# Patient Record
Sex: MALE | Race: WHITE | NOT HISPANIC OR LATINO
[De-identification: names, ages, dates, MRNs, and addresses within clinical notes are randomized per-mention and may not be internally consistent; named-entity substitution may affect disease eponyms.]

---

## 2019-05-28 PROBLEM — Z00.00 ENCOUNTER FOR PREVENTIVE HEALTH EXAMINATION: Status: ACTIVE | Noted: 2019-05-28

## 2019-06-18 ENCOUNTER — NON-APPOINTMENT (OUTPATIENT)
Age: 74
End: 2019-06-18

## 2019-06-18 ENCOUNTER — APPOINTMENT (OUTPATIENT)
Dept: CARDIOLOGY | Facility: CLINIC | Age: 74
End: 2019-06-18
Payer: MEDICARE

## 2019-06-18 VITALS — DIASTOLIC BLOOD PRESSURE: 72 MMHG | SYSTOLIC BLOOD PRESSURE: 136 MMHG

## 2019-06-18 VITALS
HEIGHT: 68 IN | SYSTOLIC BLOOD PRESSURE: 142 MMHG | BODY MASS INDEX: 24.86 KG/M2 | WEIGHT: 164 LBS | OXYGEN SATURATION: 97 % | DIASTOLIC BLOOD PRESSURE: 80 MMHG | HEART RATE: 47 BPM

## 2019-06-18 DIAGNOSIS — Z87.19 PERSONAL HISTORY OF OTHER DISEASES OF THE DIGESTIVE SYSTEM: ICD-10-CM

## 2019-06-18 DIAGNOSIS — Z87.891 PERSONAL HISTORY OF NICOTINE DEPENDENCE: ICD-10-CM

## 2019-06-18 PROCEDURE — 99214 OFFICE O/P EST MOD 30 MIN: CPT

## 2019-06-18 PROCEDURE — 93000 ELECTROCARDIOGRAM COMPLETE: CPT

## 2019-06-18 RX ORDER — HYDROCHLOROTHIAZIDE 12.5 MG/1
12.5 TABLET ORAL
Qty: 90 | Refills: 0 | Status: ACTIVE | COMMUNITY
Start: 2019-03-11

## 2019-06-18 RX ORDER — AMLODIPINE BESYLATE 5 MG/1
5 TABLET ORAL
Qty: 90 | Refills: 0 | Status: ACTIVE | COMMUNITY
Start: 2019-03-20

## 2019-06-18 NOTE — DISCUSSION/SUMMARY
[FreeTextEntry1] : Yosi is a 73 year-old male with medical history detailed above and active medical issues including:\par \par - Dyspnea moderate exertion, fatigue, multiple CAD risk factors. Patient will have noninvasive testing with a exercise Myoview stress test to assess for obstructive CAD, exercise-induced arrhythmia,  blood pressure response, 2DEcho for LVEF, wall motion, structural heart disease,  carotid and abdominal ultrasound to assess for obstructive PAD. \par \par - Hypertension at BP goal <130/80 on amlodipine, losartan HCTZ, carvedilol\par \par - Bradycardia on carvedilol. Holter monitor for evaluation of bradycardia, pauses.\par \par - Remote stroke history 1998, nonobstructive catheterization at that time.  Currently on Plavix. \par \par - Remote history tobacco quit 1982\par \par \par Patient will be seen in cardiology follow-up after noninvasive testing.\par \par \par Advised patient to follow active lifestyle with regular cardiovascular exercise. Patient educated on lifestyle and diet modification with low sodium low fat diet and avoidance of excessive alcohol. Patient is aware to call with any symptoms or concerns. \par \par Yosi will followup with Dr Michael Calderon in [place] for primary care.\par \par

## 2019-06-18 NOTE — REASON FOR VISIT
[Consultation] : a consultation regarding [FreeTextEntry2] : PATEL, HTN, CVA, murmur, fatigue, bradycardia on coreg [FreeTextEntry1] : Yosi is a 73-year-old male with history of hypertension, remote tobacco, syncope, stroke 1998, nonobstructive catheterization 1998. \par \par Patient is currently living locally wishes to establish cardiologist, sent for cardiology evaluation by Dr Michael Canchola in [facility], [phone]\par \par Patient has dyspnea with moderate exertion.  Patient has increasing fatigue over the past several months.  Cardiovascular review of symptoms is negative for exertional chest pain, palpitations, dizziness or syncope.  No PND or orthopnea leg edema.  No bleeding or black stool.\par \par Patient is walking 10 minutes without exertional chest pain.  No exercise routine.  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 \par \par EKG 6/18/19 sinus bradycardia 47 bpm, nondiagnostic RSR V1\par

## 2019-06-18 NOTE — PHYSICAL EXAM
[General Appearance - Well Developed] : well developed [Well Groomed] : well groomed [Normal Appearance] : normal appearance [General Appearance - Well Nourished] : well nourished [No Deformities] : no deformities [General Appearance - In No Acute Distress] : no acute distress [Normal Conjunctiva] : the conjunctiva exhibited no abnormalities [Eyelids - No Xanthelasma] : the eyelids demonstrated no xanthelasmas [No Oral Pallor] : no oral pallor [Normal Oral Mucosa] : normal oral mucosa [No Oral Cyanosis] : no oral cyanosis [Normal Jugular Venous A Waves Present] : normal jugular venous A waves present [Normal Jugular Venous V Waves Present] : normal jugular venous V waves present [No Jugular Venous Kinney A Waves] : no jugular venous kinney A waves [Heart Rate And Rhythm] : heart rate and rhythm were normal [Heart Sounds] : normal S1 and S2 [Edema] : no peripheral edema present [FreeTextEntry1] : bradycardia 2/6 CITNIA RSB, DM LSB [Respiration, Rhythm And Depth] : normal respiratory rhythm and effort [Exaggerated Use Of Accessory Muscles For Inspiration] : no accessory muscle use [Abdomen Soft] : soft [Auscultation Breath Sounds / Voice Sounds] : lungs were clear to auscultation bilaterally [Abdomen Tenderness] : non-tender [Abdomen Mass (___ Cm)] : no abdominal mass palpated [Gait - Sufficient For Exercise Testing] : the gait was sufficient for exercise testing [Abnormal Walk] : normal gait [Nail Clubbing] : no clubbing of the fingernails [Cyanosis, Localized] : no localized cyanosis [Petechial Hemorrhages (___cm)] : no petechial hemorrhages [] : no rash [Skin Color & Pigmentation] : normal skin color and pigmentation [No Venous Stasis] : no venous stasis [Skin Lesions] : no skin lesions [No Xanthoma] : no  xanthoma was observed [No Skin Ulcers] : no skin ulcer [Affect] : the affect was normal [Oriented To Time, Place, And Person] : oriented to person, place, and time [No Anxiety] : not feeling anxious [Mood] : the mood was normal

## 2019-06-18 NOTE — REVIEW OF SYSTEMS
[Feeling Fatigued] : feeling fatigued [Dyspnea on exertion] : dyspnea during exertion [Chest  Pressure] : no chest pressure [Leg Claudication] : no intermittent leg claudication [Chest Pain] : no chest pain [Palpitations] : no palpitations [Dizziness] : no dizziness [Negative] : Heme/Lymph

## 2019-06-21 PROCEDURE — 93224 XTRNL ECG REC UP TO 48 HRS: CPT

## 2019-07-09 ENCOUNTER — APPOINTMENT (OUTPATIENT)
Dept: CARDIOLOGY | Facility: CLINIC | Age: 74
End: 2019-07-09
Payer: MEDICARE

## 2019-07-09 PROCEDURE — A9502: CPT

## 2019-07-09 PROCEDURE — 93015 CV STRESS TEST SUPVJ I&R: CPT

## 2019-07-09 PROCEDURE — 93306 TTE W/DOPPLER COMPLETE: CPT

## 2019-07-09 PROCEDURE — 93979 VASCULAR STUDY: CPT

## 2019-07-09 PROCEDURE — 93880 EXTRACRANIAL BILAT STUDY: CPT

## 2019-07-09 PROCEDURE — 78452 HT MUSCLE IMAGE SPECT MULT: CPT

## 2019-07-18 ENCOUNTER — APPOINTMENT (OUTPATIENT)
Dept: CARDIOLOGY | Facility: CLINIC | Age: 74
End: 2019-07-18
Payer: MEDICARE

## 2019-07-18 VITALS
DIASTOLIC BLOOD PRESSURE: 60 MMHG | HEART RATE: 53 BPM | OXYGEN SATURATION: 98 % | WEIGHT: 160 LBS | SYSTOLIC BLOOD PRESSURE: 124 MMHG | BODY MASS INDEX: 24.33 KG/M2

## 2019-07-18 DIAGNOSIS — R94.01 ABNORMAL ELECTROENCEPHALOGRAM [EEG]: ICD-10-CM

## 2019-07-18 DIAGNOSIS — I63.9 CEREBRAL INFARCTION, UNSPECIFIED: ICD-10-CM

## 2019-07-18 DIAGNOSIS — R01.1 CARDIAC MURMUR, UNSPECIFIED: ICD-10-CM

## 2019-07-18 PROCEDURE — 99214 OFFICE O/P EST MOD 30 MIN: CPT

## 2019-07-18 NOTE — REVIEW OF SYSTEMS
[Feeling Fatigued] : feeling fatigued [Dyspnea on exertion] : dyspnea during exertion [Negative] : Heme/Lymph [Chest  Pressure] : no chest pressure [Chest Pain] : no chest pain [Leg Claudication] : no intermittent leg claudication [Palpitations] : no palpitations [Dizziness] : no dizziness

## 2019-07-18 NOTE — PHYSICAL EXAM
[General Appearance - Well Developed] : well developed [Normal Appearance] : normal appearance [Well Groomed] : well groomed [General Appearance - Well Nourished] : well nourished [No Deformities] : no deformities [General Appearance - In No Acute Distress] : no acute distress [Normal Conjunctiva] : the conjunctiva exhibited no abnormalities [Eyelids - No Xanthelasma] : the eyelids demonstrated no xanthelasmas [Normal Oral Mucosa] : normal oral mucosa [No Oral Pallor] : no oral pallor [No Oral Cyanosis] : no oral cyanosis [Normal Jugular Venous A Waves Present] : normal jugular venous A waves present [Normal Jugular Venous V Waves Present] : normal jugular venous V waves present [No Jugular Venous Kinney A Waves] : no jugular venous kinney A waves [Respiration, Rhythm And Depth] : normal respiratory rhythm and effort [Exaggerated Use Of Accessory Muscles For Inspiration] : no accessory muscle use [Auscultation Breath Sounds / Voice Sounds] : lungs were clear to auscultation bilaterally [Heart Rate And Rhythm] : heart rate and rhythm were normal [Heart Sounds] : normal S1 and S2 [Edema] : no peripheral edema present [Abdomen Soft] : soft [Abdomen Tenderness] : non-tender [Abdomen Mass (___ Cm)] : no abdominal mass palpated [Abnormal Walk] : normal gait [Gait - Sufficient For Exercise Testing] : the gait was sufficient for exercise testing [Nail Clubbing] : no clubbing of the fingernails [Cyanosis, Localized] : no localized cyanosis [Petechial Hemorrhages (___cm)] : no petechial hemorrhages [Skin Color & Pigmentation] : normal skin color and pigmentation [] : no rash [No Venous Stasis] : no venous stasis [Skin Lesions] : no skin lesions [No Skin Ulcers] : no skin ulcer [No Xanthoma] : no  xanthoma was observed [Oriented To Time, Place, And Person] : oriented to person, place, and time [Affect] : the affect was normal [Mood] : the mood was normal [No Anxiety] : not feeling anxious [FreeTextEntry1] : bradycardia 2/6 CINTIA RSB, DM LSB

## 2019-07-18 NOTE — DISCUSSION/SUMMARY
[FreeTextEntry1] : Yosi is a 73 year-old male with medical history detailed above and active medical issues including:\par \par - No anginal symptoms, normal perfusion Myoview stress test normal LVEF July 2019\par \par - Hypertension at BP goal <130/80 on amlodipine, losartan HCTZ, carvedilol\par \par - Posterior leaflet mitral valve prolapse with mild to moderate MR, normal LVEF, echo July 2019\par \par - Bradycardia on carvedilol. Holter monitor no significant symptomatic bradycardia or pauses.\par \par - Remote stroke history 1998, nonobstructive catheterization at that time.  Currently on Plavix. \par \par - Remote history tobacco quit 1982\par \par \par Patient will be seen in cardiology follow-up one year at patient request with 2-D echocardiogram to assess for  LV systolic function, wall motion and  MR . Current cardiac medications remain unchanged. Repeat labs will be ordered with PMD.\par \par Advised patient to follow active lifestyle with regular cardiovascular exercise. Patient educated on lifestyle and diet modification with low sodium low fat diet and avoidance of excessive alcohol. Patient is aware to call with any symptoms or concerns. \par \par Yosi will followup with Dr Michael Calderon in [place] for primary care.\par \par

## 2019-07-18 NOTE — REASON FOR VISIT
[Follow-Up - Clinic] : a clinic follow-up of [FreeTextEntry2] : noninvasive testing for PATEL, HTN, CVA, murmur, fatigue, bradycardia on coreg [FreeTextEntry1] : Yosi is a 73-year-old male with history of hypertension, remote tobacco, syncope, stroke 1998, nonobstructive catheterization 1998. \par \par Patient is currently living locally wishes to establish cardiologist, sent for cardiology evaluation by Dr Michael Canchola in [facility], [phone]\par \par Patient has dyspnea with moderate exertion.  Patient has increasing fatigue over the past several months. Cardiovascular review of symptoms is negative for exertional chest pain, palpitations, dizziness or syncope.  No PND or orthopnea leg edema.  No bleeding or black stool.\par \par Patient is walking 10 minutes without exertional chest pain.  No exercise routine.  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 \par \par Exercise Myoview stress test July 2019, LVEF 70%, normal perfusion, diaphragm attenuation seen, equivocal EKG response, chest pain, baseline sinus bradycardia, 86% MPHR, 10 minutes Leandro protocol.\par \par Echocardiogram July 2019, LVEF 55-60%, mild SI dysfunction, posterior leaflet mitral valve prolapse, mild to moderate MR, mild AS and TR, normal RVSP, LA 4.6cm.\par \par Carotid and abdominal ultrasound July 2019 mild nonobstructive plaque, normal abdominal aortic size\par \par Holter monitor June 2019, sinus rhythm 40-80 average heart rate 54 bpm, rare PACs, no PVCs, no symptoms\par \par EKG 6/18/19 sinus bradycardia 47 bpm, nondiagnostic RSR V1\par \par \par

## 2023-12-18 ENCOUNTER — APPOINTMENT (OUTPATIENT)
Dept: CARDIOLOGY | Facility: CLINIC | Age: 78
End: 2023-12-18
Payer: MEDICARE

## 2023-12-18 ENCOUNTER — NON-APPOINTMENT (OUTPATIENT)
Age: 78
End: 2023-12-18

## 2023-12-18 VITALS
OXYGEN SATURATION: 95 % | DIASTOLIC BLOOD PRESSURE: 58 MMHG | BODY MASS INDEX: 23.87 KG/M2 | WEIGHT: 157 LBS | HEART RATE: 67 BPM | SYSTOLIC BLOOD PRESSURE: 126 MMHG

## 2023-12-18 DIAGNOSIS — R00.1 BRADYCARDIA, UNSPECIFIED: ICD-10-CM

## 2023-12-18 DIAGNOSIS — I10 ESSENTIAL (PRIMARY) HYPERTENSION: ICD-10-CM

## 2023-12-18 DIAGNOSIS — R06.02 SHORTNESS OF BREATH: ICD-10-CM

## 2023-12-18 DIAGNOSIS — T50.905A BRADYCARDIA, UNSPECIFIED: ICD-10-CM

## 2023-12-18 PROCEDURE — 93000 ELECTROCARDIOGRAM COMPLETE: CPT

## 2023-12-18 PROCEDURE — 99204 OFFICE O/P NEW MOD 45 MIN: CPT | Mod: 25

## 2023-12-18 RX ORDER — CARVEDILOL 12.5 MG/1
12.5 TABLET, FILM COATED ORAL
Qty: 180 | Refills: 0 | Status: DISCONTINUED | COMMUNITY
Start: 2019-02-11 | End: 2023-12-18

## 2023-12-18 RX ORDER — ASPIRIN 325 MG
TABLET ORAL DAILY
Refills: 0 | Status: ACTIVE | COMMUNITY

## 2023-12-18 RX ORDER — CLOPIDOGREL 75 MG/1
75 TABLET, FILM COATED ORAL
Qty: 90 | Refills: 0 | Status: DISCONTINUED | COMMUNITY
Start: 2019-01-09 | End: 2023-12-18

## 2023-12-18 RX ORDER — VALSARTAN 80 MG/1
80 TABLET, COATED ORAL DAILY
Refills: 0 | Status: ACTIVE | COMMUNITY

## 2023-12-18 RX ORDER — LOSARTAN POTASSIUM 100 MG/1
100 TABLET, FILM COATED ORAL
Qty: 90 | Refills: 0 | Status: DISCONTINUED | COMMUNITY
Start: 2019-03-11 | End: 2023-12-18

## 2023-12-18 NOTE — REASON FOR VISIT
[Follow-Up - Clinic] : a clinic follow-up of [FreeTextEntry1] : ZARA JACKSON  is a 78 year M  who presents today Dec 18, 2023 for preoperative clearance for upcoming hernia repair.  Today he denies chest pain, pressure, unusual shortness of breath, lightheadedness, dizziness, near syncope or syncope.  Active with no new exertional complaints.  Overall he has been feeling well. Medications have remained unchanged. Asymptomatic from cardiovascular and arrhythmia standpoint.   Zara is a 78-year-old male with history of hypertension, remote tobacco, syncope, stroke 1998, nonobstructive catheterization 1998.        [FreeTextEntry2] : noninvasive testing for PATEL, HTN, CVA, murmur, fatigue, bradycardia on coreg

## 2023-12-18 NOTE — CARDIOLOGY SUMMARY
[de-identified] : EKG 6/18/19 sinus bradycardia 47 bpm, nondiagnostic RSR V1  EKG 12/18/2023 SR 65bpm    [de-identified] : Exercise Myoview stress test July 2019, LVEF 70%, normal perfusion, diaphragm attenuation seen, equivocal EKG response, chest pain, baseline sinus bradycardia, 86% MPHR, 10 minutes Leandro protocol. [de-identified] : Echocardiogram July 2019, LVEF 55-60%, mild SI dysfunction, posterior leaflet mitral valve prolapse, mild to moderate MR, mild AS and TR, normal RVSP, LA 4.6cm. [de-identified] : Holter monitor June 2019, sinus rhythm 40-80 average heart rate 54 bpm, rare PACs, no PVCs, no symptoms  [de-identified] : Carotid and abdominal ultrasound July 2019 mild nonobstructive plaque, normal abdominal aortic size

## 2023-12-18 NOTE — DISCUSSION/SUMMARY
[FreeTextEntry1] : ZARA JACKSON  is a 78 year M  who presents today Dec 18, 2023 with the above history and the following active issues.   - No anginal symptoms, normal perfusion Myoview stress test normal LVEF July 2019  - Hypertension at BP goal <130/80 on amlodipine, losartan HCTZ, carvedilol  - Posterior leaflet mitral valve prolapse with mild to moderate MR, normal LVEF, echo July 2019 Recommend follow-up echo for evaluation of resting heart structure and function.  - Bradycardia on carvedilol. Holter monitor no significant symptomatic bradycardia or pauses.  - Remote stroke history 1998, nonobstructive catheterization at that time.  Currently on Plavix.   - Remote history tobacco quit 1982  - Preoperative status for hernia repair At present, there are no active cardiac conditions.  No recent unstable coronary syndromes, decompensated heart failure, severe valvular heart disease or significant dysrhythmias.   Baseline functional status is good with no new exertional complaints.     The clinical benefit of the proposed procedure outweighs the associated cardiovascular risk.   Risk not attenuated with further CV testing.   Prior testing as outlined above. Optimized from a cardiovascular perspective. Continue beta blocker  Red flag symptoms which would warrant sooner emergent evaluation reviewed with the patient.  Questions and concerns were addressed and answered.  Clinical follow-up with Dr. Valentino  Sincerely,  Shreya Lira PA-C Patients history, testing and plan reviewed with supervising MD: Dr. Ian Lopez

## 2024-02-07 ENCOUNTER — APPOINTMENT (OUTPATIENT)
Dept: CARDIOLOGY | Facility: CLINIC | Age: 79
End: 2024-02-07